# Patient Record
Sex: FEMALE | Race: WHITE | Employment: FULL TIME | URBAN - METROPOLITAN AREA
[De-identification: names, ages, dates, MRNs, and addresses within clinical notes are randomized per-mention and may not be internally consistent; named-entity substitution may affect disease eponyms.]

---

## 2022-04-20 ENCOUNTER — TELEPHONE (OUTPATIENT)
Dept: SURGERY | Age: 42
End: 2022-04-20

## 2022-04-20 ENCOUNTER — OFFICE VISIT (OUTPATIENT)
Dept: SURGERY | Age: 42
End: 2022-04-20
Payer: MEDICAID

## 2022-04-20 VITALS
RESPIRATION RATE: 18 BRPM | DIASTOLIC BLOOD PRESSURE: 82 MMHG | SYSTOLIC BLOOD PRESSURE: 153 MMHG | BODY MASS INDEX: 26.37 KG/M2 | HEART RATE: 107 BPM | TEMPERATURE: 97.3 F | HEIGHT: 67 IN | OXYGEN SATURATION: 96 % | WEIGHT: 168 LBS

## 2022-04-20 DIAGNOSIS — R22.41 MASS OF RIGHT THIGH: Primary | ICD-10-CM

## 2022-04-20 PROCEDURE — G8427 DOCREV CUR MEDS BY ELIG CLIN: HCPCS | Performed by: SURGERY

## 2022-04-20 PROCEDURE — G8419 CALC BMI OUT NRM PARAM NOF/U: HCPCS | Performed by: SURGERY

## 2022-04-20 PROCEDURE — 4004F PT TOBACCO SCREEN RCVD TLK: CPT | Performed by: SURGERY

## 2022-04-20 PROCEDURE — 99204 OFFICE O/P NEW MOD 45 MIN: CPT | Performed by: SURGERY

## 2022-04-20 RX ORDER — CEPHALEXIN 500 MG/1
CAPSULE ORAL
COMMUNITY
Start: 2022-04-08 | End: 2022-04-26

## 2022-04-20 RX ORDER — SULFAMETHOXAZOLE AND TRIMETHOPRIM 800; 160 MG/1; MG/1
TABLET ORAL
COMMUNITY
Start: 2022-04-08 | End: 2022-04-26

## 2022-04-21 ENCOUNTER — TELEPHONE (OUTPATIENT)
Dept: SURGERY | Age: 42
End: 2022-04-21

## 2022-04-21 NOTE — TELEPHONE ENCOUNTER
Spoke with patient. Waiting for surgery scheduling to confirm 5/3/22 surgery date. Patient aware and is good with that date.

## 2022-04-26 NOTE — PROGRESS NOTES
[facility] PRE-ADMISSION TESTING INSTRUCTIONS    The Preadmission Testing patient is instructed accordingly using the following criteria (check applicable):    ARRIVAL INSTRUCTIONS:  [x] Parking the day of Surgery is located in the Main Entrance lot. Upon entering the door, make an immediate right to the surgery reception desk    [x] Bring photo ID and insurance card    [] Bring in a copy of Living will or Durable Power of  papers. [x] Please be sure to arrange for responsible adult to provide transportation to and from the hospital    [x] Please arrange for responsible adult to be with you for the 24 hour period post procedure due to having anesthesia      GENERAL INSTRUCTIONS:    [x] Nothing by mouth after midnight, including gum, candy, mints or water    [x] You may brush your teeth, but do not swallow any water    [] Take medications as instructed with 1-2 oz of water    [x] Stop herbal supplements and vitamins 5 days prior to procedure    [] Follow preop dosing of blood thinners per physician instructions    [] Take 1/2 dose of evening insulin, but no insulin after midnight    [] No oral diabetic medications after midnight    [] If diabetic and have low blood sugar or feel symptomatic, take 1-2oz apple juice only    [] Bring inhalers day of surgery    [] Bring C-PAP/ Bi-Pap day of surgery    [x] Bring urine specimen day of surgery    [x] Shower or bath with soap, lather and rinse well, AM of Surgery, no lotion, powders or creams to surgical site    [] Follow bowel prep as instructed per surgeon    [x] No tobacco products within 24 hours of surgery     [x] No alcohol or illegal drug use within 24 hours of surgery.     [x] Jewelry, body piercing's, eyeglasses, contact lenses and dentures are not permitted into surgery (bring cases)      [x] Please do not wear any nail polish, make up or hair products on the day of surgery    [x] You can expect a call the business day prior to procedure to notify you if your arrival time changes    [x] If you receive a survey after surgery we would greatly appreciate your comments    [] Parent/guardian of a minor must accompany their child and remain on the premises  the entire time they are under our care     [] Pediatric patients may bring favorite toy, blanket or comfort item with them    [] A caregiver or family member must remain with the patient during their stay if they are mentally handicapped, have dementia, disoriented or unable to use a call light or would be a safety concern if left unattended    [x] Please notify surgeon if you develop any illness between now and time of surgery (cold, cough, sore throat, fever, nausea, vomiting) or any signs of infections  including skin, wounds, and dental.    [x]  The Outpatient Pharmacy is available to fill your prescription here on your day of surgery, ask your preop nurse for details    [] Other instructions    EDUCATIONAL MATERIALS PROVIDED:    [] PAT Preoperative Education Packet/Booklet     [] Medication List    [] Transfusion bracelet applied with instructions    [] Shower with soap, lather and rinse well, and use CHG wipes provided the evening before surgery as instructed    [] Incentive spirometer with instructions

## 2022-04-26 NOTE — PROGRESS NOTES

## 2022-05-02 ENCOUNTER — ANESTHESIA EVENT (OUTPATIENT)
Dept: OPERATING ROOM | Age: 42
End: 2022-05-02
Payer: MEDICAID

## 2022-05-03 ENCOUNTER — ANESTHESIA (OUTPATIENT)
Dept: OPERATING ROOM | Age: 42
End: 2022-05-03
Payer: MEDICAID

## 2022-05-03 ENCOUNTER — HOSPITAL ENCOUNTER (OUTPATIENT)
Age: 42
Setting detail: OUTPATIENT SURGERY
Discharge: HOME | End: 2022-05-03
Attending: SURGERY | Admitting: SURGERY
Payer: MEDICAID

## 2022-05-03 VITALS
TEMPERATURE: 97 F | RESPIRATION RATE: 16 BRPM | SYSTOLIC BLOOD PRESSURE: 109 MMHG | DIASTOLIC BLOOD PRESSURE: 57 MMHG | HEART RATE: 79 BPM | BODY MASS INDEX: 26.37 KG/M2 | WEIGHT: 168 LBS | HEIGHT: 67 IN | OXYGEN SATURATION: 97 %

## 2022-05-03 VITALS — OXYGEN SATURATION: 97 % | DIASTOLIC BLOOD PRESSURE: 55 MMHG | TEMPERATURE: 97.3 F | SYSTOLIC BLOOD PRESSURE: 94 MMHG

## 2022-05-03 LAB
HCG, URINE, POC: NEGATIVE
Lab: NORMAL
NEGATIVE QC PASS/FAIL: NORMAL
POSITIVE QC PASS/FAIL: NORMAL

## 2022-05-03 PROCEDURE — 3600000002 HC SURGERY LEVEL 2 BASE: Performed by: SURGERY

## 2022-05-03 PROCEDURE — 3700000001 HC ADD 15 MINUTES (ANESTHESIA): Performed by: SURGERY

## 2022-05-03 PROCEDURE — 2500000003 HC RX 250 WO HCPCS: Performed by: SURGERY

## 2022-05-03 PROCEDURE — 2500000003 HC RX 250 WO HCPCS

## 2022-05-03 PROCEDURE — 6360000002 HC RX W HCPCS

## 2022-05-03 PROCEDURE — 3700000000 HC ANESTHESIA ATTENDED CARE: Performed by: SURGERY

## 2022-05-03 PROCEDURE — 21931 EXC BACK LES SC 3 CM/>: CPT | Performed by: SURGERY

## 2022-05-03 PROCEDURE — 88304 TISSUE EXAM BY PATHOLOGIST: CPT

## 2022-05-03 PROCEDURE — 3600000012 HC SURGERY LEVEL 2 ADDTL 15MIN: Performed by: SURGERY

## 2022-05-03 PROCEDURE — 7100000011 HC PHASE II RECOVERY - ADDTL 15 MIN: Performed by: SURGERY

## 2022-05-03 PROCEDURE — 2580000003 HC RX 258

## 2022-05-03 PROCEDURE — 7100000010 HC PHASE II RECOVERY - FIRST 15 MIN: Performed by: SURGERY

## 2022-05-03 PROCEDURE — 2709999900 HC NON-CHARGEABLE SUPPLY: Performed by: SURGERY

## 2022-05-03 RX ORDER — SODIUM CHLORIDE 0.9 % (FLUSH) 0.9 %
5-40 SYRINGE (ML) INJECTION EVERY 12 HOURS SCHEDULED
Status: DISCONTINUED | OUTPATIENT
Start: 2022-05-03 | End: 2022-05-03 | Stop reason: HOSPADM

## 2022-05-03 RX ORDER — SODIUM CHLORIDE 9 MG/ML
INJECTION, SOLUTION INTRAVENOUS CONTINUOUS PRN
Status: DISCONTINUED | OUTPATIENT
Start: 2022-05-03 | End: 2022-05-03 | Stop reason: SDUPTHER

## 2022-05-03 RX ORDER — PROPOFOL 10 MG/ML
INJECTION, EMULSION INTRAVENOUS CONTINUOUS PRN
Status: DISCONTINUED | OUTPATIENT
Start: 2022-05-03 | End: 2022-05-03 | Stop reason: SDUPTHER

## 2022-05-03 RX ORDER — ONDANSETRON 2 MG/ML
INJECTION INTRAMUSCULAR; INTRAVENOUS PRN
Status: DISCONTINUED | OUTPATIENT
Start: 2022-05-03 | End: 2022-05-03 | Stop reason: SDUPTHER

## 2022-05-03 RX ORDER — PROPOFOL 10 MG/ML
INJECTION, EMULSION INTRAVENOUS PRN
Status: DISCONTINUED | OUTPATIENT
Start: 2022-05-03 | End: 2022-05-03

## 2022-05-03 RX ORDER — KETAMINE HYDROCHLORIDE 10 MG/ML
INJECTION, SOLUTION INTRAMUSCULAR; INTRAVENOUS PRN
Status: DISCONTINUED | OUTPATIENT
Start: 2022-05-03 | End: 2022-05-03 | Stop reason: SDUPTHER

## 2022-05-03 RX ORDER — LIDOCAINE HYDROCHLORIDE 10 MG/ML
INJECTION, SOLUTION EPIDURAL; INFILTRATION; INTRACAUDAL; PERINEURAL PRN
Status: DISCONTINUED | OUTPATIENT
Start: 2022-05-03 | End: 2022-05-03 | Stop reason: SDUPTHER

## 2022-05-03 RX ORDER — GLYCOPYRROLATE 1 MG/5 ML
SYRINGE (ML) INTRAVENOUS PRN
Status: DISCONTINUED | OUTPATIENT
Start: 2022-05-03 | End: 2022-05-03 | Stop reason: SDUPTHER

## 2022-05-03 RX ORDER — DEXAMETHASONE SODIUM PHOSPHATE 4 MG/ML
INJECTION, SOLUTION INTRA-ARTICULAR; INTRALESIONAL; INTRAMUSCULAR; INTRAVENOUS; SOFT TISSUE PRN
Status: DISCONTINUED | OUTPATIENT
Start: 2022-05-03 | End: 2022-05-03 | Stop reason: SDUPTHER

## 2022-05-03 RX ORDER — FENTANYL CITRATE 50 UG/ML
INJECTION, SOLUTION INTRAMUSCULAR; INTRAVENOUS PRN
Status: DISCONTINUED | OUTPATIENT
Start: 2022-05-03 | End: 2022-05-03 | Stop reason: SDUPTHER

## 2022-05-03 RX ORDER — SODIUM CHLORIDE 9 MG/ML
INJECTION, SOLUTION INTRAVENOUS PRN
Status: DISCONTINUED | OUTPATIENT
Start: 2022-05-03 | End: 2022-05-03 | Stop reason: HOSPADM

## 2022-05-03 RX ORDER — BUPIVACAINE HYDROCHLORIDE 5 MG/ML
INJECTION, SOLUTION EPIDURAL; INTRACAUDAL PRN
Status: DISCONTINUED | OUTPATIENT
Start: 2022-05-03 | End: 2022-05-03 | Stop reason: ALTCHOICE

## 2022-05-03 RX ORDER — MIDAZOLAM HYDROCHLORIDE 1 MG/ML
INJECTION INTRAMUSCULAR; INTRAVENOUS PRN
Status: DISCONTINUED | OUTPATIENT
Start: 2022-05-03 | End: 2022-05-03 | Stop reason: SDUPTHER

## 2022-05-03 RX ORDER — SODIUM CHLORIDE 0.9 % (FLUSH) 0.9 %
5-40 SYRINGE (ML) INJECTION PRN
Status: DISCONTINUED | OUTPATIENT
Start: 2022-05-03 | End: 2022-05-03 | Stop reason: HOSPADM

## 2022-05-03 RX ORDER — SODIUM CHLORIDE 9 MG/ML
INJECTION, SOLUTION INTRAVENOUS CONTINUOUS
Status: DISCONTINUED | OUTPATIENT
Start: 2022-05-03 | End: 2022-05-03 | Stop reason: HOSPADM

## 2022-05-03 RX ADMIN — Medication 0.2 MG: at 09:28

## 2022-05-03 RX ADMIN — KETAMINE HYDROCHLORIDE 30 MG: 10 INJECTION INTRAMUSCULAR; INTRAVENOUS at 09:35

## 2022-05-03 RX ADMIN — FENTANYL CITRATE 50 MCG: 50 INJECTION, SOLUTION INTRAMUSCULAR; INTRAVENOUS at 10:08

## 2022-05-03 RX ADMIN — LIDOCAINE HYDROCHLORIDE 20 MG: 10 INJECTION, SOLUTION EPIDURAL; INFILTRATION; INTRACAUDAL; PERINEURAL at 09:35

## 2022-05-03 RX ADMIN — FENTANYL CITRATE 25 MCG: 50 INJECTION, SOLUTION INTRAMUSCULAR; INTRAVENOUS at 09:48

## 2022-05-03 RX ADMIN — FENTANYL CITRATE 25 MCG: 50 INJECTION, SOLUTION INTRAMUSCULAR; INTRAVENOUS at 09:41

## 2022-05-03 RX ADMIN — DEXAMETHASONE SODIUM PHOSPHATE 8 MG: 4 INJECTION, SOLUTION INTRAMUSCULAR; INTRAVENOUS at 09:28

## 2022-05-03 RX ADMIN — ONDANSETRON 4 MG: 2 INJECTION INTRAMUSCULAR; INTRAVENOUS at 09:28

## 2022-05-03 RX ADMIN — SODIUM CHLORIDE: 9 INJECTION, SOLUTION INTRAVENOUS at 09:28

## 2022-05-03 RX ADMIN — PROPOFOL 75 MCG/KG/MIN: 10 INJECTION, EMULSION INTRAVENOUS at 09:35

## 2022-05-03 RX ADMIN — MIDAZOLAM 2 MG: 1 INJECTION INTRAMUSCULAR; INTRAVENOUS at 09:28

## 2022-05-03 ASSESSMENT — PAIN DESCRIPTION - LOCATION
LOCATION: BUTTOCKS

## 2022-05-03 ASSESSMENT — PULMONARY FUNCTION TESTS
PIF_VALUE: 1
PIF_VALUE: 1
PIF_VALUE: 0
PIF_VALUE: 1
PIF_VALUE: 1
PIF_VALUE: 0
PIF_VALUE: 1
PIF_VALUE: 0
PIF_VALUE: 1
PIF_VALUE: 0
PIF_VALUE: 1
PIF_VALUE: 0
PIF_VALUE: 1
PIF_VALUE: 0
PIF_VALUE: 1
PIF_VALUE: 0
PIF_VALUE: 1

## 2022-05-03 ASSESSMENT — PAIN DESCRIPTION - ORIENTATION
ORIENTATION: RIGHT

## 2022-05-03 ASSESSMENT — PAIN DESCRIPTION - PAIN TYPE
TYPE: SURGICAL PAIN

## 2022-05-03 ASSESSMENT — LIFESTYLE VARIABLES: SMOKING_STATUS: 1

## 2022-05-03 ASSESSMENT — PAIN SCALES - GENERAL
PAINLEVEL_OUTOF10: 3

## 2022-05-03 ASSESSMENT — PAIN DESCRIPTION - FREQUENCY: FREQUENCY: CONTINUOUS

## 2022-05-03 ASSESSMENT — PAIN - FUNCTIONAL ASSESSMENT
PAIN_FUNCTIONAL_ASSESSMENT: 0-10
PAIN_FUNCTIONAL_ASSESSMENT: ACTIVITIES ARE NOT PREVENTED
PAIN_FUNCTIONAL_ASSESSMENT: ACTIVITIES ARE NOT PREVENTED

## 2022-05-03 ASSESSMENT — PAIN DESCRIPTION - DESCRIPTORS
DESCRIPTORS: SORE

## 2022-05-03 NOTE — ANESTHESIA PRE PROCEDURE
Department of Anesthesiology  Preprocedure Note       Name:  Sim Vásquez   Age:  35 y.o.  :  1980                                          MRN:  [id number]         Date:  5/3/2022      Surgeon: Gala Rizzo):  Ashlyn Somers MD    Procedure: Procedure(s):  EXCISION RIGHT BUTTOCK MASS    Medications prior to admission:   Prior to Admission medications    Not on File       Current medications:    Current Facility-Administered Medications   Medication Dose Route Frequency Provider Last Rate Last Admin    0.9 % sodium chloride infusion   IntraVENous Continuous Ashlyn Somers MD        sodium chloride flush 0.9 % injection 5-40 mL  5-40 mL IntraVENous 2 times per day Ashlyn Somers MD        sodium chloride flush 0.9 % injection 5-40 mL  5-40 mL IntraVENous PRN Ashlyn Somers MD        0.9 % sodium chloride infusion   IntraVENous PRN Ashlyn Somers MD           Allergies:  No Known Allergies    Problem List:    Patient Active Problem List   Diagnosis Code    Pubic ramus fracture ([facility] [address] S32.599A    Radius fracture S52.90XA       Past Medical History:  History reviewed. No pertinent past medical history. Past Surgical History:        Procedure Laterality Date    TUBAL LIGATION         Social History:    Social History     Tobacco Use    Smoking status: Current Every Day Smoker     Packs/day: 1.00     Years: 21.00     Pack years: 21.00     Types: Cigarettes    Smokeless tobacco: Never Used   Substance Use Topics    Alcohol use:  Yes     Alcohol/week: 2.0 standard drinks     Types: 2 Cans of beer per week     Comment: socially                                Ready to quit: Not Answered  Counseling given: Not Answered      Vital Signs (Current):   Vitals:    22 1522 22 0742   BP:  115/62   Pulse:  77   Resp:  20   Temp:  36.3 °C (97.4 °F)   TempSrc:  Temporal   SpO2:  96%   Weight: 168 lb (76.2 kg) 168 lb (76.2 kg)   Height: 5' 7\" (1.702 m) 5' 7\" (1.702 m) BP Readings from Last 3 Encounters:   05/03/22 115/62   04/20/22 (!) 153/82       NPO Status: Time of last liquid consumption: 1800                        Time of last solid consumption: 1800                        Date of last liquid consumption: 05/02/22                        Date of last solid food consumption: 05/02/22    BMI:   Wt Readings from Last 3 Encounters:   05/03/22 168 lb (76.2 kg)   04/20/22 168 lb (76.2 kg)     Body mass index is 26.31 kg/m². CBC:   Lab Results   Component Value Date    WBC 5.1 11/07/2019    RBC 4.44 11/07/2019    HGB 14.7 11/07/2019    HCT 44.3 11/07/2019    MCV 99.9 11/07/2019    RDW 13.6 11/07/2019     11/07/2019       CMP:   Lab Results   Component Value Date     11/07/2019    K 3.5 11/07/2019     11/07/2019    CO2 23 11/07/2019    BUN 6 11/07/2019    CREATININE 0.8 11/07/2019    GFRAA 96 11/07/2019    LABGLOM 79 11/07/2019    GLUCOSE 110 11/07/2019    PROT 6.0 07/31/2013    CALCIUM 9.2 11/07/2019    BILITOT 0.4 07/31/2013    ALKPHOS 50 07/31/2013    AST 20 07/31/2013    ALT 17 07/31/2013       POC Tests: No results for input(s): POCGLU, POCNA, POCK, POCCL, POCBUN, POCHEMO, POCHCT in the last 72 hours.     Coags: No results found for: PROTIME, INR, APTT    HCG (If Applicable): No results found for: PREGTESTUR, PREGSERUM, HCG, HCGQUANT     ABGs: No results found for: PHART, PO2ART, XUP3LMY, OOI3USJ, BEART, S7LCDUDB     Type & Screen (If Applicable):  No results found for: LABABO, LABRH    Drug/Infectious Status (If Applicable):  No results found for: HIV, HEPCAB    COVID-19 Screening (If Applicable): No results found for: COVID19        Anesthesia Evaluation     Anesthesia Plan        josh oro, APRN - CRNA   5/3/2022

## 2022-05-03 NOTE — ANESTHESIA POSTPROCEDURE EVALUATION
Department of Anesthesiology  Postprocedure Note    Patient: Grzegorz Alvarez  MRN: [id number]  YOB: 1980  Date of evaluation: 5/3/2022  Time:  10:33 AM     Procedure Summary     Date: 05/03/22 Room / Location: [facility] OR 01 / [facility]    Anesthesia Start: 6715 Anesthesia Stop: 1703    Procedure: EXCISION RIGHT BUTTOCK MASS (Right Buttocks) Diagnosis: (RIGHT BUTTOCK MASS)    Surgeons: Jonny Coles MD Responsible Provider: Lisset Menezes MD    Anesthesia Type: MAC ASA Status: 2          Anesthesia Type: MAC    Doni Phase I: Doni Score: 10    Doni Phase II: Doni Score: 10    Last vitals: Reviewed and per EMR flowsheets.        Anesthesia Post Evaluation    Patient location during evaluation: PACU  Patient participation: complete - patient participated  Level of consciousness: awake  Pain score: 0  Airway patency: patent  Nausea & Vomiting: no nausea and no vomiting  Complications: no  Cardiovascular status: blood pressure returned to baseline and hemodynamically stable  Respiratory status: acceptable  Hydration status: euvolemic    KISHORE styles - CRNA

## 2022-05-03 NOTE — OP NOTE
[address] [address]                                OPERATIVE REPORT    PATIENT NAME: James Roque                    :        1980  MED REC NO:   [id number]                            ROOM:  ACCOUNT NO:   [de-identified]                           ADMIT DATE: 2022  PROVIDER:     Jonny Coles MD    DATE OF PROCEDURE:  2022    PREOPERATIVE DIAGNOSIS:  Soft tissue mass, left buttock. POSTOPERATIVE DIAGNOSIS:  Lipoma, left buttock. PROCEDURE PERFORMED:  Excision lipoma, left buttock. SURGEON:  Jonny Coles M.D.    ESTIMATED BLOOD LOSS:  Minimal.    OPERATIVE FINDINGS:  A 4.5 cm x 4 cm soft tissue mass consistent with a  benign lipoma. DESCRIPTION OF PROCEDURE:  With the patient in the right lateral  position on the operating table, after adequate sedation was obtained by  Anesthesia, the operative area was prepped with DuraPrep, and the  patient was draped in the usual sterile manner. Lidocaine 1% with  epinephrine was used for local anesthesia. A palpable soft tissue mass  was identified over the perianal region over the right buttock. Skin  incision was made and deepened through the subcutaneous tissue. The  mass was then identified lying within the subcutaneous tissue and  extending into the intramuscular structure, consistent with a benign  lipoma and measuring about 4.5 cm x 4.5 cm in diameter. The entire  specimen was dissected free from the surrounding tissues. Care was  taken to avoid injury to the surrounding neuromuscular structures. Entire specimen was removed and sent for Pathology. Complete hemostasis  was secured with cautery. Wound was irrigated with saline solution and  was closed with #3-0 Vicryl interrupted suture for subcutaneous layer  and 4-0 Monocryl continuous subcuticular suture for the skin. Dermabond  was applied to the skin.   The patient tolerated the procedure well.         Aniket Muller MD    D: 05/03/2022 11:18:17       T: 05/03/2022 11:22:52     [email or web address]  Job#: [id number]     Doc#: [id number]    CC:

## 2022-05-03 NOTE — ANESTHESIA PRE PROCEDURE
Department of Anesthesiology  Preprocedure Note       Name:  Russ Freitas   Age:  35 y.o.  :  1980                                          MRN:  [id number]         Date:  5/3/2022      Surgeon: Ashley Gardner):  Angelica Chiagn MD    Procedure: Procedure(s):  EXCISION RIGHT BUTTOCK MASS    Medications prior to admission:   Prior to Admission medications    Not on File       Current medications:    Current Facility-Administered Medications   Medication Dose Route Frequency Provider Last Rate Last Admin    0.9 % sodium chloride infusion   IntraVENous Continuous Angelica Chiang MD        sodium chloride flush 0.9 % injection 5-40 mL  5-40 mL IntraVENous 2 times per day Angelica Chiang MD        sodium chloride flush 0.9 % injection 5-40 mL  5-40 mL IntraVENous PRN Angelica Chiang MD        0.9 % sodium chloride infusion   IntraVENous PRN Angelica Chiang MD           Allergies:  No Known Allergies    Problem List:    Patient Active Problem List   Diagnosis Code    [facility] ([facility] S32.599A    Radius fracture S52.90XA       Past Medical History:  History reviewed. No pertinent past medical history. Past Surgical History:        Procedure Laterality Date    TUBAL LIGATION         Social History:    Social History     Tobacco Use    Smoking status: Current Every Day Smoker     Packs/day: 1.00     Years: 21.00     Pack years: 21.00     Types: Cigarettes    Smokeless tobacco: Never Used   Substance Use Topics    Alcohol use:  Yes     Alcohol/week: 2.0 standard drinks     Types: 2 Cans of beer per week     Comment: socially                                Ready to quit: Not Answered  Counseling given: Not Answered      Vital Signs (Current):   Vitals:    22 1522   Weight: 168 lb (76.2 kg)   Height: 5' 7\" (1.702 m)                                              BP Readings from Last 3 Encounters:   22 (!) 153/82       NPO Status:   NPO > 8hrs BMI:   Wt Readings from Last 3 Encounters:   04/26/22 168 lb (76.2 kg)   04/20/22 168 lb (76.2 kg)     Body mass index is 26.31 kg/m². CBC:   Lab Results   Component Value Date    WBC 5.1 11/07/2019    RBC 4.44 11/07/2019    HGB 14.7 11/07/2019    HCT 44.3 11/07/2019    MCV 99.9 11/07/2019    RDW 13.6 11/07/2019     11/07/2019       CMP:   Lab Results   Component Value Date     11/07/2019    K 3.5 11/07/2019     11/07/2019    CO2 23 11/07/2019    BUN 6 11/07/2019    CREATININE 0.8 11/07/2019    GFRAA 96 11/07/2019    LABGLOM 79 11/07/2019    GLUCOSE 110 11/07/2019    PROT 6.0 07/31/2013    CALCIUM 9.2 11/07/2019    BILITOT 0.4 07/31/2013    ALKPHOS 50 07/31/2013    AST 20 07/31/2013    ALT 17 07/31/2013       POC Tests: No results for input(s): POCGLU, POCNA, POCK, POCCL, POCBUN, POCHEMO, POCHCT in the last 72 hours. Coags: No results found for: PROTIME, INR, APTT    HCG (If Applicable): No results found for: PREGTESTUR, PREGSERUM, HCG, HCGQUANT     ABGs: No results found for: PHART, PO2ART, JXC9JTW, ZXN7XQB, BEART, O1QWFJXH     Type & Screen (If Applicable):  No results found for: LABABO, LABRH    Drug/Infectious Status (If Applicable):  No results found for: HIV, HEPCAB    COVID-19 Screening (If Applicable): No results found for: COVID19        Anesthesia Evaluation  Patient summary reviewed and Nursing notes reviewed no history of anesthetic complications:   Airway: Mallampati: II  TM distance: >3 FB   Neck ROM: full  Mouth opening: > = 3 FB Dental:          Pulmonary:normal exam  breath sounds clear to auscultation  (+) current smoker          Patient smoked on day of surgery.                  Cardiovascular:Negative CV ROS  Exercise tolerance: good (>4 METS),           Rhythm: regular  Rate: normal           Beta Blocker:  Not on Beta Blocker         Neuro/Psych:   Negative Neuro/Psych ROS              GI/Hepatic/Renal: Neg GI/Hepatic/Renal ROS            Endo/Other: Negative Endo/Other ROS                    Abdominal:             Vascular: negative vascular ROS. Other Findings:           Anesthesia Plan      MAC     ASA 2       Induction: intravenous. MIPS: Prophylactic antiemetics administered. Anesthetic plan and risks discussed with patient. Use of blood products discussed with patient whom consented to blood products. Plan discussed with CRNA and attending. Ema Adler RN   5/3/2022      DOS STAFF ADDENDUM:    Pt seen and examined, physical exam updated, chart reviewed including anesthesia, drug and allergy history. H&P reviewed. No interval changes to history or physical examination (unless noted above). NPO status confirmed. Anesthetic plan, risks, benefits, alternatives discussed with patient. Patient verbalized an understanding and agrees to proceed.      Sherrie Ontiveros MD   Anesthesiologist

## 2022-05-03 NOTE — PROGRESS NOTES
1045 admitted to stage 2 pacu   1050 pt taking po well   1110 family member here at bedside and disscharge instructions reviewed with pt and her family member and they verbalized understanding of instructions   1120 discharged into the care of her family member

## 2022-05-03 NOTE — BRIEF OP NOTE
Brief Postoperative Note      Patient:  Lexy Muro  YOB: 1980  MRN: [id number]    Date of Procedure: 5/3/2022    Pre-Op Diagnosis: RIGHT BUTTOCK MASS    Post-Op Diagnosis: Same       Procedure(s):  EXCISION RIGHT BUTTOCK MASS    Surgeon(s):  Mike Harris MD    Assistant:  * No surgical staff found *    Anesthesia: Monitor Anesthesia Care    Estimated Blood Loss (mL): Minimal    Complications: None    Specimens:   ID Type Source Tests Collected by Time Destination   A : RIGHT INNER BUTTOCK LIPOMA Tissue Tissue SURGICAL PATHOLOGY Mike Harris MD 5/3/2022 1000        Implants:  * No implants in log *      Drains: * No LDAs found *    Findings:     Electronically signed by Mike Harris MD on 5/3/2022 at 11:06 AM

## 2022-05-09 NOTE — H&P
Be Barrientos MD   Physician   Specialty:  General Surgery   Progress Notes       Signed   Encounter Date:  4/20/2022                 Signed        Expand AllCollapse All             Patient's Name/Date of Birth: Montez Smalls / 1980     Date: 4/20/2022     PCP: Lakisha Macias MD          Chief Complaint   Patient presents with    New Patient    Mass       perianal abscess          HPI:  Patient seen for evaluation of right thigh soft tissue mass, has had for some time, increasing in size, painful. Had infection 2 weeks ago     Patient's medications, allergies, past medical, surgical, social and family histories were reviewed and updated as appropriate.     No Known Allergies     Past Medical History   No past medical history on file.         Past Surgical History         Past Surgical History:   Procedure Laterality Date    TUBAL LIGATION                Social History            Tobacco Use    Smoking status: Current Every Day Smoker       Packs/day: 1.00       Years: 21.00       Pack years: 21.00       Types: Cigarettes    Smokeless tobacco: Never Used   Substance Use Topics    Alcohol use: Yes       Alcohol/week: 2.0 standard drinks       Types: 2 Cans of beer per week       Comment: socially         Current Facility-Administered Medications          Current Outpatient Medications   Medication Sig Dispense Refill    cephALEXin (KEFLEX) 500 MG capsule take 1 capsule by mouth every 6 hours until finished        sulfamethoxazole-trimethoprim (BACTRIM DS;SEPTRA DS) 800-160 MG per tablet take 1 tablet by mouth twice a day        oxyCODONE-acetaminophen (PERCOCET) 5-325 MG per tablet Take 1-2 tablets by mouth every 4 hours as needed for Pain (For moderate pain level 4-6). 60 tablet 0    docusate sodium 100 MG CAPS Take 100 mg by mouth 2 times daily. 60 capsule 0    Cranberry (AZO-CRANBERRY) 450 MG TABS Take 1 tablet by mouth daily.          No current facility-administered medications for this visit.    Review of Systems  Constitutional: negative  Eyes: negative  Ears, nose, mouth, throat, and face: negative  Respiratory: negative  Cardiovascular: negative  Gastrointestinal: negative  Genitourinary:negative  Integument/breast: negative  Hematologic/lymphatic: negative  Musculoskeletal:negative  Neurological: negative  Allergic/Immunologic: negative     Physical exam:  BP (!) 153/82   Pulse 107   Temp 97.3 °F (36.3 °C)   Resp 18   Ht 5' 7\" (1.702 m)   Wt 168 lb (76.2 kg)   SpO2 96%   BMI 26.31 kg/m²   General appearance: no acute distress  Head:NCAT, EOMI, PERRLA, conjunctiva pink  Neck: no masses, supple  Lungs: CTABL  Heart: RRR  Abdomen: soft, nondistended, nontender, no guarding, no peritoneal signs, normoactive bowel sounds  Extremities:no edema  Right thigh: 3.5 cm soft tissue mass consistent with lipoma  Neuro exam: normal  Assessment/Plan:  .proceed with excision right thigh mass  The procedure risks, benfits, possible complications and alternative options where explained to the patient, hse understands and agrees to proceed with surgery.     No follow-ups on file.  Violeta Millard MD        Send copy of H&P to PCP, Jory Oviedo MD                        Office Visit on 4/20/2022           Office Visit on 4/20/2022                Detailed Report            Note shared with patient        Progress Notes Info    Author Note Status Last Update User Last Update Date/Time   Bishop Gracia MD Signed Bishop Gracia MD 4/20/2022  1:07 PM     Chart Review Routing History    No routing history on file. Patient's History and Physical was reviewed. Patient examined. There has been no change.

## 2022-05-18 ENCOUNTER — OFFICE VISIT (OUTPATIENT)
Dept: SURGERY | Age: 42
End: 2022-05-18

## 2022-05-18 VITALS
HEART RATE: 90 BPM | SYSTOLIC BLOOD PRESSURE: 130 MMHG | DIASTOLIC BLOOD PRESSURE: 74 MMHG | TEMPERATURE: 98.1 F | BODY MASS INDEX: 26.37 KG/M2 | RESPIRATION RATE: 16 BRPM | HEIGHT: 67 IN | WEIGHT: 168 LBS

## 2022-05-18 DIAGNOSIS — Z09 POSTOP CHECK: Primary | ICD-10-CM

## 2022-05-18 PROCEDURE — 99024 POSTOP FOLLOW-UP VISIT: CPT | Performed by: SURGERY

## 2022-05-18 NOTE — PROGRESS NOTES
Patient's Name/Date of Birth: Adilene Adrian / 1980    Date: 5/18/2022    PCP: Pauly Nielsen MD    Chief Complaint   Patient presents with    Post-Op Check     excision right buttock mass       HPI:  Patient heree for post op check, s/p excision lipoma right buttock. Patient's medications, allergies, past medical, surgical, social and family histories were reviewed and updated as appropriate. No Known Allergies    History reviewed. No pertinent past medical history. Past Surgical History:   Procedure Laterality Date    BACK SURGERY Right 5/3/2022    EXCISION RIGHT BUTTOCK MASS performed by Greta Burdick MD at [address] [place] [address]  2001        Social History     Tobacco Use    Smoking status: Current Every Day Smoker     Packs/day: 1.00     Years: 21.00     Pack years: 21.00     Types: Cigarettes    Smokeless tobacco: Never Used   Substance Use Topics    Alcohol use: Yes     Alcohol/week: 2.0 standard drinks     Types: 2 Cans of beer per week     Comment: socially       No current outpatient medications on file. No current facility-administered medications for this visit.          Review of Systems  Constitutional: negative  Eyes: negative  Ears, nose, mouth, throat, and face: negative  Respiratory: negative  Cardiovascular: negative  Gastrointestinal: negative  Genitourinary:negative  Integument/breast: negative  Hematologic/lymphatic: negative  Musculoskeletal:negative  Neurological: negative  Allergic/Immunologic: negative    Physical exam:  /74 (Site: Right Upper Arm, Position: Sitting, Cuff Size: Medium Adult)   Pulse 90   Temp 98.1 °F (36.7 °C) (Temporal)   Resp 16   Ht 5' 7\" (1.702 m)   Wt 168 lb (76.2 kg)   BMI 26.31 kg/m²   General appearance: no acute distress  Head:NCAT, EOMI, PERRLA, conjunctiva pink  Neck: no masses, supple  Lungs: CTABL  Heart: RRR  Abdomen: soft, nondistended, nontender, no guarding, no peritoneal signs, normoactive bowel sounds  Extremities:no edema    Assessment/Plan:  Wound healed  discharged    No follow-ups on file.     Mike Harris MD      Send copy of H&P to PCP, Mervin Campos MD

## 2022-08-09 ENCOUNTER — HOSPITAL ENCOUNTER (OUTPATIENT)
Dept: GENERAL RADIOLOGY | Age: 42
Discharge: HOME | End: 2022-08-11
Payer: MEDICAID

## 2022-08-09 DIAGNOSIS — N63.10 BREAST MASS, RIGHT: ICD-10-CM

## 2022-08-09 PROCEDURE — 76642 ULTRASOUND BREAST LIMITED: CPT

## 2022-08-09 PROCEDURE — G0279 TOMOSYNTHESIS, MAMMO: HCPCS

## 2022-08-17 ENCOUNTER — TELEPHONE (OUTPATIENT)
Dept: BREAST CENTER | Age: 42
End: 2022-08-17

## 2022-08-17 NOTE — TELEPHONE ENCOUNTER
Patient was referred by Fior Romero for right breast lump. Patient was scheduled on 08/22/2022 in [facility] office @ 1:30pm with RIGOBERTO Boothe. Patient was instructed to bring a photo ID, insurance card (if applicable), and list of any current medications. Patient verbalized understanding of appointment instructions.         Electronically signed by Zeenat Hutchison RN on 8/17/22 at 2:48 PM EDT

## 2022-08-18 NOTE — PROGRESS NOTES
Subjective:      Patient ID: Leslee Narayanan is a 35 y.o. female. HPI  History and Physical    Patient's Name/Date of Birth: Leslee Narayanan / 1980    Date: 2022    Leslee Narayanan presents for evaluation of a Right breast cyst.    Gynecologist: Dr. Delfin Hylton. Adonis Rosario is a 35 y.o. extremely pleasant female who presents for clinical follow-up of multiple, bilateral, simple and complex cysts. The largest cyst is noted of the right breast and was found on self-exam approximately 3 months prior to presentation. She reports the cyst has gotten larger and is now producing more discomfort than previously noted. She denies a personal history of breast cancer. Breast cancer risk factors include gender alone. Denies any maternal family history of breast, colon, prostate, pancreatic, ovarian, or melanoma cancer. Paternal history is unknown. Ashkenazi Shinto Ancestry: No.  No history of prior thoracic radiation therapy. OBSTETRIC RELATED HISTORY:  Age of menarche was 13. LMP  2022. Patient denies hormonal therapy. Patient is . Age of first live birth was 10. Patient did not breast feed. Is patient interested in fertility information about fertility preservation? No    CANCER SURVEILLANCE HISTORY:  Mammograms: Yes   Breast MRI's: No   Breast Biopsies: No   Colonoscopy: No   GI Polyps: Not Applicable   EGD: No   Pelvic Exam: Yes   Pap Smear: Yes   Dermatology: No   Lung screening: no    Estimated body mass index is 26.31 kg/m² as calculated from the following:    Height as of 22: 5' 7\" (1.702 m). Weight as of 22: 168 lb (76.2 kg). Bra Size: 34b    Because violence is so common, we ask all our patients: are you in a relationship or do you live with a person who threatens, hurts, or controls you:  denies  Patient drinks little caffeinated beverages. Patient does smoke cigarettes. Smokes 1 PPD X 30 years. Patient does not use recreational drugs.     Past medical history includes asthma. Past Surgical History:   Procedure Laterality Date    BACK SURGERY Right 5/3/2022    EXCISION RIGHT BUTTOCK MASS performed by Jarod Philip MD at [address]  2001     No current outpatient medications on file. No current facility-administered medications for this visit. No Known Allergies    Family History   Problem Relation Age of Onset    Asthma Mother     Cancer Brother 23        non-hodgkins lymphoma       Social History     Socioeconomic History    Marital status: Single     Spouse name: Not on file    Number of children: Not on file    Years of education: Not on file    Highest education level: Not on file   Occupational History    Not on file   Tobacco Use    Smoking status: Every Day     Packs/day: 1.00     Years: 21.00     Pack years: 21.00     Types: Cigarettes    Smokeless tobacco: Never   Vaping Use    Vaping Use: Never used   Substance and Sexual Activity    Alcohol use: Yes     Alcohol/week: 2.0 standard drinks     Types: 2 Cans of beer per week     Comment: socially    Drug use: No    Sexual activity: Yes     Partners: Male   Other Topics Concern    Not on file   Social History Narrative    Not on file     Social Determinants of Health     Financial Resource Strain: Not on file   Food Insecurity: Not on file   Transportation Needs: Not on file   Physical Activity: Not on file   Stress: Not on file   Social Connections: Not on file   Intimate Partner Violence: Not on file   Housing Stability: Not on file       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 Review of Systems   Constitutional:         Reports she generally feels well. Does not have a family doctor and has not had routine visits aside from gynecology. HENT: Negative. Respiratory: Negative. Negative for cough and shortness of breath. Cardiovascular:  Negative for chest pain and palpitations.    Musculoskeletal:  Negative for arthralgias, back pain and myalgias. Neurological: Negative. Patient denies previous history of DVT/PE. Objective:   Physical Exam  Vitals and nursing note reviewed. Constitutional:       Appearance: Normal appearance. Comments: ECOG 0; pleasant and conversant. HENT:      Head: Normocephalic and atraumatic. Eyes:      Extraocular Movements: Extraocular movements intact. Conjunctiva/sclera: Conjunctivae normal.   Cardiovascular:      Rate and Rhythm: Normal rate and regular rhythm. Heart sounds: Normal heart sounds. Pulmonary:      Effort: Pulmonary effort is normal.      Breath sounds: Normal breath sounds. Chest:      Chest wall: No mass. Breasts:     Right: Mass present. Left: Mass present. Abdominal:      Palpations: Abdomen is soft. Musculoskeletal:         General: Normal range of motion. Cervical back: Normal range of motion and neck supple. Skin:     General: Skin is warm and dry. Neurological:      General: No focal deficit present. Mental Status: She is alert and oriented to person, place, and time. Psychiatric:         Mood and Affect: Mood normal.         Thought Content: Thought content normal.         Judgment: Judgment normal.       Assessment:    Kimo Miranda is a 35 y.o. extremely pleasant woman without known risks for breast cancer who presents with complaints of painful right breast cyst.    Imaging @ [facility]:  08/09/2022 bilateral diagnostic mammogram and right breast ultrasound revealed multiple, bilateral oval masses with well-circumscribed and partially obscured margins suggestive of cysts. Benign,     08/22/2022 clinical follow-up is without evidence of malignancy. She has multiple, bilateral, cystic type densities, the largest is of the right breast, 5 o'clock position adjacent to the NAC. This cyst is tender to palpation. At this time, we will plan ultrasound-guided cyst aspiration of the right breast, 5:00 position.   She was advised that the cysts can often times recur. She was encouraged to wear a good supportive bra at all times while awake, limit caffeine, and use topical pain relieving gel as needed for discomfort. She was also reassured that once she goes through menopause her breasts should start to quiet down. We reviewed her imaging, including her BI-RADS result. We reviewed her [employer] IBS risk score putting her at average lifetime risk of developing breast cancer. However, it should be noted that her paternal history is unknown. We reviewed NCCN guidelines for breast cancer screening and recommendations would be to repeat bilateral screening mammogram in 1 year and sooner for any new or worsening symptoms. We reviewed BSE in detail on this visit and the importance of calling in the event she would notice any changes. We discussed the importance of obtaining a primary care physician for routine and preventative health care as well as the importance of a screening colonoscopy beginning at age 39. We discussed smoking cessation as well as option of low-dose CT lung screening for cancer. She was advised to call our office at anytime should she wish to proceed or should she develop any new or worsening breast related complaints. Plan:      Continue monthly breast self examination; detailed instructions reviewed today. Bring any changes to your physician's attention. Continue healthy diet and exercise routinely as tolerated. Avoid alcohol. Limit caffeine intake. Repeat mammogram August 2023. Continue follow up with gynecology  US guided right breast cyst aspiration.   RTC PRN      I spent a total of 30 minutes on the date of the service which included preparing to see the patient, face-to-face patient care, completing clinical documentation, obtaining and/or reviewing separately obtained history, performing a medically appropriate examination, counseling and educating the patient/family/caregiver, ordering medications, tests, or procedures, communicating with other HCPs (not separately reported), independently interpreting results (not separately reported), communicating results to the patient/family/caregiver and care coordination (not separately reported). This document is generated, in part, by voice recognition software and thus syntax and grammatical errors are possible. Rosendo Azar) [address] [place] [address]., RN, MSN, APRN-CNP, [address]  Advanced Oncology Certified Nurse Practitioner  Department of Breast Surgery  [facility] Breast [facility]/  [facility] [place] in collaboration with Dr. Dat Patterson.  Telly/Dr. Greg Lucio/Dr. Ryan Barrios APRN-CNP

## 2022-08-22 ENCOUNTER — OFFICE VISIT (OUTPATIENT)
Dept: BREAST CENTER | Age: 42
End: 2022-08-22
Payer: MEDICAID

## 2022-08-22 VITALS
DIASTOLIC BLOOD PRESSURE: 70 MMHG | HEIGHT: 66 IN | HEART RATE: 94 BPM | WEIGHT: 162 LBS | BODY MASS INDEX: 26.03 KG/M2 | RESPIRATION RATE: 20 BRPM | TEMPERATURE: 98.3 F | SYSTOLIC BLOOD PRESSURE: 128 MMHG | OXYGEN SATURATION: 98 %

## 2022-08-22 DIAGNOSIS — N60.02 BILATERAL BREAST CYSTS: Primary | ICD-10-CM

## 2022-08-22 DIAGNOSIS — N60.01 CYST OF RIGHT BREAST: ICD-10-CM

## 2022-08-22 DIAGNOSIS — Z72.0 TOBACCO ABUSE: ICD-10-CM

## 2022-08-22 DIAGNOSIS — N64.4 BREAST PAIN, RIGHT: ICD-10-CM

## 2022-08-22 DIAGNOSIS — N60.01 BILATERAL BREAST CYSTS: Primary | ICD-10-CM

## 2022-08-22 PROCEDURE — 4004F PT TOBACCO SCREEN RCVD TLK: CPT | Performed by: NURSE PRACTITIONER

## 2022-08-22 PROCEDURE — G8419 CALC BMI OUT NRM PARAM NOF/U: HCPCS | Performed by: NURSE PRACTITIONER

## 2022-08-22 PROCEDURE — 99203 OFFICE O/P NEW LOW 30 MIN: CPT | Performed by: NURSE PRACTITIONER

## 2022-08-22 PROCEDURE — G8427 DOCREV CUR MEDS BY ELIG CLIN: HCPCS | Performed by: NURSE PRACTITIONER

## 2022-08-22 ASSESSMENT — ENCOUNTER SYMPTOMS
BACK PAIN: 0
RESPIRATORY NEGATIVE: 1
COUGH: 0
SHORTNESS OF BREATH: 0

## 2022-08-22 NOTE — LETTER
[address] [facility]  [address] [address]  Phone: [phone]  Fax: [phone]    KISHORE Burciaga CNP    August 22, 2022     [place],   [address] [facility],  [address]    Patient: Josue Gutierrez   MR Number: [id number]   YOB: 1980   Date of Visit: 8/22/2022       Dear Dr. Daisy Ruelas,    Thank you for referring Lupe Gutierrez to the office of Dr. Sanford Aguirre, Dr. Parish Pedersen, Dr. Kellee Skinner, and Nurse Practitioner Pauline Gottlieb. to me for evaluation/treatment. Below are the relevant portions of my assessment and plan of care. Apurva Díaz is a 35 y.o. extremely pleasant woman without known risks for breast cancer who presents with complaints of painful right breast cyst.    Imaging @ [address] [place] [address] Dr Johnson: 08/09/2022 bilateral diagnostic mammogram and right breast ultrasound revealed multiple, bilateral oval masses with well-circumscribed and partially obscured margins suggestive of cysts. Benign,     08/22/2022 clinical follow-up is without evidence of malignancy. She has multiple, bilateral, cystic type densities, the largest is of the right breast, 5 o'clock position adjacent to the NAC. This cyst is tender to palpation. At this time, we will plan ultrasound-guided cyst aspiration of the right breast, 5:00 position. She was advised that the cysts can often times recur. She was encouraged to wear a good supportive bra at all times while awake, limit caffeine, and use topical pain relieving gel as needed for discomfort. She was also reassured that once she goes through menopause her breast should start to quiet down. We reviewed her imaging, including her BI-RADS result. We reviewed her [facility] IBS risk score putting her at average lifetime risk of developing breast cancer. However, it should be noted that her paternal history is unknown.   We reviewed NCCN guidelines for breast cancer screening and recommendations would be to repeat bilateral screening mammogram in 1 year and sooner for any new or worsening symptoms. We reviewed BSE in detail on this visit and the importance of calling in the event she would notice any changes. We discussed the importance of obtaining a primary care physician for routine and preventative health care as well as the importance of a screening colonoscopy beginning at age 39. We discussed smoking cessation as well as option of low-dose CT lung screening for cancer. She was advised to call our office at anytime should she wish to proceed or should she develop any new or worsening breast related complaints. If you have questions, please do not hesitate to call me. We appreciate the opportunity to participate in the care of this pleasant woman. Sincerely,    Idania Monteiro (Jennifer Kristian) Sheryl Miranda, RN, MSN, APRN-CNP, [address]  Advanced Oncology Certified Nurse Practitioner  Department of Breast Surgery  [facility]/  [facility] in collaboration with Dr. Ramona Carrasco.  Telly/Dr. Anabel Lucio/Dr. Félix Suresh, APRN - CNP

## 2022-08-25 ENCOUNTER — HOSPITAL ENCOUNTER (OUTPATIENT)
Dept: GENERAL RADIOLOGY | Age: 42
Discharge: HOME | End: 2022-08-27
Payer: MEDICAID

## 2022-08-25 DIAGNOSIS — N64.4 BREAST PAIN, RIGHT: ICD-10-CM

## 2022-08-25 DIAGNOSIS — N60.01 CYST OF RIGHT BREAST: ICD-10-CM

## 2022-08-25 PROCEDURE — 76942 ECHO GUIDE FOR BIOPSY: CPT

## 2022-08-26 PROCEDURE — 2500000003 HC RX 250 WO HCPCS

## 2023-03-07 ENCOUNTER — HOSPITAL ENCOUNTER (OUTPATIENT)
Dept: ULTRASOUND IMAGING | Age: 43
Discharge: HOME | End: 2023-03-09
Payer: MEDICAID

## 2023-03-07 DIAGNOSIS — N94.6 DYSMENORRHEA: ICD-10-CM

## 2023-03-07 PROCEDURE — 76830 TRANSVAGINAL US NON-OB: CPT

## 2023-06-28 ENCOUNTER — HOSPITAL ENCOUNTER (OUTPATIENT)
Age: 43
Discharge: HOME | End: 2023-06-30

## 2023-06-28 PROCEDURE — 88304 TISSUE EXAM BY PATHOLOGIST: CPT

## 2023-07-25 ENCOUNTER — OFFICE VISIT (OUTPATIENT)
Dept: FAMILY MEDICINE CLINIC | Age: 43
End: 2023-07-25
Payer: MEDICAID

## 2023-07-25 VITALS
HEART RATE: 92 BPM | BODY MASS INDEX: 24.01 KG/M2 | RESPIRATION RATE: 18 BRPM | WEIGHT: 153 LBS | TEMPERATURE: 97.8 F | HEIGHT: 67 IN | SYSTOLIC BLOOD PRESSURE: 128 MMHG | OXYGEN SATURATION: 99 % | DIASTOLIC BLOOD PRESSURE: 72 MMHG

## 2023-07-25 DIAGNOSIS — R07.81 RIB PAIN ON RIGHT SIDE: Primary | ICD-10-CM
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
## 2023-07-25 PROCEDURE — 99203 OFFICE O/P NEW LOW 30 MIN: CPT | Performed by: PHYSICIAN ASSISTANT

## 2023-07-25 PROCEDURE — G8420 CALC BMI NORM PARAMETERS: HCPCS | Performed by: PHYSICIAN ASSISTANT

## 2023-07-25 PROCEDURE — G8427 DOCREV CUR MEDS BY ELIG CLIN: HCPCS | Performed by: PHYSICIAN ASSISTANT

## 2023-07-25 PROCEDURE — 4004F PT TOBACCO SCREEN RCVD TLK: CPT | Performed by: PHYSICIAN ASSISTANT

## 2023-07-25 RX ORDER — CYCLOBENZAPRINE HCL 10 MG
10 TABLET ORAL 3 TIMES DAILY PRN
Qty: 15 TABLET | Refills: 0 | Status: SHIPPED | OUTPATIENT
Start: 2023-07-25 | End: 2023-08-04

## 2023-07-25 RX ORDER — NAPROXEN 500 MG/1
500 TABLET ORAL 2 TIMES DAILY PRN
Qty: 20 TABLET | Refills: 0 | Status: SHIPPED | OUTPATIENT
Start: 2023-07-25
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
## (undated) DEVICE — ADHESIVE SKIN CLSR 0.7ML TOP DERMBND ADV

## (undated) DEVICE — GLOVE ORANGE PI 7 1/2   MSG9075

## (undated) DEVICE — CHLORAPREP 26ML ORANGE

## (undated) DEVICE — DOUBLE BASIN SET: Brand: MEDLINE INDUSTRIES, INC.

## (undated) DEVICE — SOLUTION IV IRRIG POUR BRL 0.9% SODIUM CHL 2F7124

## (undated) DEVICE — PACK PROCEDURE SURG GEN CUST

## (undated) DEVICE — GLOVE ORANGE PI 7   MSG9070

## (undated) DEVICE — TOWEL,OR,DSP,ST,BLUE,STD,6/PK,12PK/CS: Brand: MEDLINE

## (undated) DEVICE — ELECTRODE PT RET AD L9FT HI MOIST COND ADH HYDRGEL CORDED

## (undated) DEVICE — 4-PORT MANIFOLD: Brand: NEPTUNE 2